# Patient Record
Sex: FEMALE | URBAN - METROPOLITAN AREA
[De-identification: names, ages, dates, MRNs, and addresses within clinical notes are randomized per-mention and may not be internally consistent; named-entity substitution may affect disease eponyms.]

---

## 2019-06-25 ENCOUNTER — THERAPY VISIT (OUTPATIENT)
Dept: PHYSICAL THERAPY | Facility: CLINIC | Age: 34
End: 2019-06-25
Payer: COMMERCIAL

## 2019-06-25 DIAGNOSIS — M25.562 BILATERAL KNEE PAIN: ICD-10-CM

## 2019-06-25 DIAGNOSIS — M25.561 BILATERAL KNEE PAIN: ICD-10-CM

## 2019-06-25 PROCEDURE — 97112 NEUROMUSCULAR REEDUCATION: CPT | Mod: GP | Performed by: PHYSICAL THERAPIST

## 2019-06-25 PROCEDURE — 97110 THERAPEUTIC EXERCISES: CPT | Mod: GP | Performed by: PHYSICAL THERAPIST

## 2019-06-25 PROCEDURE — 97161 PT EVAL LOW COMPLEX 20 MIN: CPT | Mod: GP | Performed by: PHYSICAL THERAPIST

## 2019-06-25 NOTE — LETTER
[facility]  [address]  [facility] [address]  [phone]    2019    Re: Jamel Hamilton   :   1985  MRN:  [id number]   REFERRING PHYSICIAN:   Tracy Banegas    [facility]  Date of Initial Evaluation:  2019  Visits:  Rxs Used: 1  Reason for Referral:  Bilateral knee pain    EVALUATION SUMMARY    [facility]  Subjective:  The history is provided by the patient. A  was used.   Jamel Hamilton being seen for B leg pain. Problem began 2019. Where condition occurred: for unknown reasons.Problem occurred: insidious  and reported as 5/10 on pain scale. General health as reported by patient is excellent. Pertinent medical history includes:  None.    Surgeries include:  None.  Current medications:  None.   Primary job tasks include:  Repetitive tasks and other (housework, ).  Pain is described as aching and other (throbbing) and is intermittent. Pain is the same all the time. Since onset symptoms are unchanged.      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    Barriers include:  None as reported by patient.  Red flags:  None as reported by patient.Type of problem:  Bilateral knees. Condition occurred with:  Insidious onset.    Problem details: Patient noted insidious onset of B knee/leg pain about one month ago. MD order from 5-20-19..   Patient reports pain:  Anterior. Radiates to:  Low back. Associated symptoms:  Tingling and numbness (intermittent N&T the bottom of both feet about twice a week with sitting). Symptoms are exacerbated by sitting, bending/squatting, ascending stairs, descending stairs and walking (sit jeannine 1 hour with 5/10, walk jeannine 15', ascend stairs recip with 3/10 pain, can't squat ) and relieved by other and rest (Tylenol).    Objective:       Lumbar/SI Evaluation  ROM:    AROM Lumbar:   Flexion:        Fingertips to floor and increase LBP  Ext:                      Side Bend:        Left:     Right:   Rotation:           Left:     Right:   Side Glide:        Left:     Right:   Hip Evaluation  Hip Strength:    Extension:  Left: 4/5  -  Pain:Right: 4/5    -  Pain:    Abduction:  Left: /5  -   Pain:Right: /5 -   Pain:  Knee Evaluation:  ROM:  AROM: normal  PROM: normal    Strength:   Extension:  Left: 4/5    Pain:+      Right: 4/5    Pain:+  Flexion:  Left: 5/5    Pain:-      Right: 5/5    Pain:-    Quad Set Left: Good    Pain:   Quad Set Right: Good    Pain:  Ligament Testing:  Normal  Special Tests:   Left knee positive for the following special tests:  Patellar Compression  Right knee positive for the following tests:  Patellar Compression  Palpation:    Left knee tenderness present at:  Patellar Lateral  Right knee tenderness present at:  Patellar Lateral  Edema:  Normal  Breezy Lumbar Evaluation  Posture:  Sitting: fair  Standing: fair  Lateral Shift: no  Correction of Posture: better    Assessment/Plan:    Patient is a 34 year old female with both sides knee complaints.    Patient has the following significant findings with corresponding treatment plan.                Diagnosis 1:  PFP  Pain -  manual therapy, splint/taping/bracing/orthotics, self management, education, directional preference exercise and home program  Decreased strength - therapeutic exercise, therapeutic activities and home program  Impaired balance - neuro re-education, therapeutic activities and home program  Decreased proprioception - neuro re-education, therapeutic activities and home program  Impaired muscle performance - neuro re-education and home program  Decreased function - therapeutic activities and home program  Impaired posture - neuro re-education and home program    Therapy Evaluation Codes:   1) History comprised of:   Personal factors that impact the plan of care:      None.    Comorbidity factors that impact the plan  of care are:      None.     Medications impacting care: None.  2) Examination of Body Systems comprised of:   Body structures and functions that impact the plan of care:      Knee.   Activity limitations that impact the plan of care are:      Sitting, Squatting/kneeling, Stairs and Walking.  3) Clinical presentation characteristics are:   Stable/Uncomplicated.  4) Decision-Making    Low complexity using standardized patient assessment instrument and/or measureable assessment of functional outcome.  Cumulative Therapy Evaluation is: Low complexity.    Previous and current functional limitations:  (See Goal Flow Sheet for this information)    Short term and Long term goals: (See Goal Flow Sheet for this information)     Communication ability:  Patient appears to be able to clearly communicate and understand verbal and written communication and follow directions correctly.  Patient has an  for communication clarity.  Treatment Explanation - The following has been discussed with the patient:   RX ordered/plan of care  Anticipated outcomes  Possible risks and side effects  This patient would benefit from PT intervention to resume normal activities.   Rehab potential is good.    Frequency:  1 X week, once daily  Duration:  for 8 weeks  Discharge Plan:  Achieve all LTG.  Independent in home treatment program.  Reach maximal therapeutic benefit.    Thank you for your referral.    INQUIRIES  Therapist: Lois Adamson, PT  [facility]  [address]  [facility] [address]  Phone: [phone]  Fax: [phone]

## 2019-06-25 NOTE — PROGRESS NOTES
[facility] Initial Evaluation  Subjective:  The history is provided by the patient. A  was used.   Jamel Hamilton being seen for B leg pain.   Problem began 5/30/2019. Where condition occurred: for unknown reasons.Problem occurred: insidious  and reported as 5/10 on pain scale. General health as reported by patient is excellent. Pertinent medical history includes:  None.    Surgeries include:  None.  Current medications:  None.   Primary job tasks include:  Repetitive tasks and other (housework, ).  Pain is described as aching and other (throbbing) and is intermittent. Pain is the same all the time. Since onset symptoms are unchanged.      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    Barriers include:  None as reported by patient.  Red flags:  None as reported by patient.  Type of problem:  Bilateral knees   Condition occurred with:  Insidious onset.    Problem details: Patient noted insidious onset of B knee/leg pain about one month ago. MD order from 5-20-19..   Patient reports pain:  Anterior. Radiates to:  Low back. Associated symptoms:  Tingling and numbness (intermittent N&T the bottom of both feet about twice a week with sitting). Symptoms are exacerbated by sitting, bending/squatting, ascending stairs, descending stairs and walking (sit jeannine 1 hour with 5/10, walk jeannine 15', ascend stairs recip with 3/10 pain, can't squat ) and relieved by other and rest (Tylenol).                      Objective:  System         Lumbar/SI Evaluation  ROM:    AROM Lumbar:   Flexion:       Fingertips to floor and increase LBP  Ext:                      Side Bend:        Left:     Right:   Rotation:           Left:     Right:   Side Glide:        Left:     Right:                                                               Hip Evaluation    Hip Strength:      Extension:  Left: 4/5  -  Pain:Right: 4/5    -  Pain:    Abduction:  Left: /5  -    Pain:Right: /5 -   Pain:                           Knee Evaluation:  ROM:  AROM: normal  PROM: normal            Strength:     Extension:  Left: 4/5    Pain:+      Right: 4/5    Pain:+  Flexion:  Left: 5/5    Pain:-      Right: 5/5    Pain:-    Quad Set Left: Good    Pain:   Quad Set Right: Good    Pain:  Ligament Testing:  Normal                Special Tests:   Left knee positive for the following special tests:  Patellar Compression  Right knee positive for the following tests:  Patellar Compression  Palpation:    Left knee tenderness present at:  Patellar Lateral  Right knee tenderness present at:  Patellar Lateral  Edema:  Normal              Breezy Lumbar Evaluation    Posture:  Sitting: fair  Standing: fair    Lateral Shift: no  Correction of Posture: better                                                   ROS    Assessment/Plan:    Patient is a 34 year old female with both sides knee complaints.    Patient has the following significant findings with corresponding treatment plan.                Diagnosis 1:  PFP  Pain -  manual therapy, splint/taping/bracing/orthotics, self management, education, directional preference exercise and home program  Decreased strength - therapeutic exercise, therapeutic activities and home program  Impaired balance - neuro re-education, therapeutic activities and home program  Decreased proprioception - neuro re-education, therapeutic activities and home program  Impaired muscle performance - neuro re-education and home program  Decreased function - therapeutic activities and home program  Impaired posture - neuro re-education and home program    Therapy Evaluation Codes:   1) History comprised of:   Personal factors that impact the plan of care:      None.    Comorbidity factors that impact the plan of care are:      None.     Medications impacting care: None.  2) Examination of Body Systems comprised of:   Body structures and functions that impact the plan of care:       Knee.   Activity limitations that impact the plan of care are:      Sitting, Squatting/kneeling, Stairs and Walking.  3) Clinical presentation characteristics are:   Stable/Uncomplicated.  4) Decision-Making    Low complexity using standardized patient assessment instrument and/or measureable assessment of functional outcome.  Cumulative Therapy Evaluation is: Low complexity.    Previous and current functional limitations:  (See Goal Flow Sheet for this information)    Short term and Long term goals: (See Goal Flow Sheet for this information)     Communication ability:  Patient appears to be able to clearly communicate and understand verbal and written communication and follow directions correctly.  Patient has an  for communication clarity.  Treatment Explanation - The following has been discussed with the patient:   RX ordered/plan of care  Anticipated outcomes  Possible risks and side effects  This patient would benefit from PT intervention to resume normal activities.   Rehab potential is good.    Frequency:  1 X week, once daily  Duration:  for 8 weeks  Discharge Plan:  Achieve all LTG.  Independent in home treatment program.  Reach maximal therapeutic benefit.    Please refer to the daily flowsheet for treatment today, total treatment time and time spent performing 1:1 timed codes.

## 2019-06-30 PROBLEM — M25.561 BILATERAL KNEE PAIN: Status: ACTIVE | Noted: 2019-06-30

## 2019-06-30 PROBLEM — M25.562 BILATERAL KNEE PAIN: Status: ACTIVE | Noted: 2019-06-30

## 2019-09-07 PROBLEM — M25.561 BILATERAL KNEE PAIN: Status: RESOLVED | Noted: 2019-06-30 | Resolved: 2019-09-07

## 2019-09-07 PROBLEM — M25.562 BILATERAL KNEE PAIN: Status: RESOLVED | Noted: 2019-06-30 | Resolved: 2019-09-07
